# Patient Record
Sex: MALE | Race: WHITE | NOT HISPANIC OR LATINO | Employment: OTHER | URBAN - METROPOLITAN AREA
[De-identification: names, ages, dates, MRNs, and addresses within clinical notes are randomized per-mention and may not be internally consistent; named-entity substitution may affect disease eponyms.]

---

## 2022-10-31 ENCOUNTER — OFFICE VISIT (OUTPATIENT)
Dept: FAMILY MEDICINE CLINIC | Facility: CLINIC | Age: 57
End: 2022-10-31

## 2022-10-31 VITALS
HEART RATE: 80 BPM | WEIGHT: 206.8 LBS | BODY MASS INDEX: 30.63 KG/M2 | DIASTOLIC BLOOD PRESSURE: 82 MMHG | HEIGHT: 69 IN | OXYGEN SATURATION: 97 % | SYSTOLIC BLOOD PRESSURE: 132 MMHG | TEMPERATURE: 97.1 F

## 2022-10-31 DIAGNOSIS — F17.210 TOBACCO DEPENDENCE DUE TO CIGARETTES: ICD-10-CM

## 2022-10-31 DIAGNOSIS — Z13.1 SCREENING FOR DIABETES MELLITUS: ICD-10-CM

## 2022-10-31 DIAGNOSIS — Z13.29 SCREENING FOR THYROID DISORDER: ICD-10-CM

## 2022-10-31 DIAGNOSIS — G89.29 CHRONIC PAIN OF RIGHT KNEE: ICD-10-CM

## 2022-10-31 DIAGNOSIS — G89.29 CHRONIC BILATERAL LOW BACK PAIN WITHOUT SCIATICA: Primary | ICD-10-CM

## 2022-10-31 DIAGNOSIS — Z11.59 ENCOUNTER FOR HEPATITIS C SCREENING TEST FOR LOW RISK PATIENT: ICD-10-CM

## 2022-10-31 DIAGNOSIS — M25.561 CHRONIC PAIN OF RIGHT KNEE: ICD-10-CM

## 2022-10-31 DIAGNOSIS — M54.50 CHRONIC BILATERAL LOW BACK PAIN WITHOUT SCIATICA: Primary | ICD-10-CM

## 2022-10-31 DIAGNOSIS — Z13.220 SCREENING CHOLESTEROL LEVEL: ICD-10-CM

## 2022-10-31 DIAGNOSIS — Z13.0 SCREENING, ANEMIA, DEFICIENCY, IRON: ICD-10-CM

## 2022-10-31 DIAGNOSIS — Z12.11 SCREENING FOR MALIGNANT NEOPLASM OF COLON: ICD-10-CM

## 2022-10-31 RX ORDER — METHOCARBAMOL 500 MG/1
500 TABLET, FILM COATED ORAL 3 TIMES DAILY PRN
Qty: 30 TABLET | Refills: 0 | Status: SHIPPED | OUTPATIENT
Start: 2022-10-31

## 2022-10-31 RX ORDER — MELOXICAM 15 MG/1
15 TABLET ORAL DAILY
Qty: 30 TABLET | Refills: 1 | Status: SHIPPED | OUTPATIENT
Start: 2022-10-31

## 2022-10-31 RX ORDER — IBUPROFEN 200 MG
200 TABLET ORAL EVERY 6 HOURS PRN
COMMUNITY

## 2022-10-31 NOTE — PROGRESS NOTES
Name: Claudette Persons      : 1965      MRN: [id number]  Encounter Provider: SYD Faustin  Encounter Date: 10/31/2022   Encounter department: [facility]     1  Chronic bilateral low back pain without sciatica  Assessment & Plan:  Check xray lumbar spine  Robaxin and meloxicam as directed for pain- hold ibuprofen  Okay to take tylenol      Orders:  -     XR spine lumbar minimum 4 views non injury; Future; Expected date: 10/31/2022  -     meloxicam (Mobic) 15 mg tablet; Take 1 tablet (15 mg total) by mouth daily  -     methocarbamol (ROBAXIN) 500 mg tablet; Take 1 tablet (500 mg total) by mouth 3 (three) times a day as needed for muscle spasms    2  Chronic pain of right knee  Assessment & Plan:  Check xray right knee  Referral to ortho  meloxicam prn pain    Orders:  -     XR knee 4+ vw right injury; Future; Expected date: 10/31/2022  -     Ambulatory Referral to Orthopedic Surgery; Future  -     meloxicam (Mobic) 15 mg tablet; Take 1 tablet (15 mg total) by mouth daily    3  Tobacco dependence due to cigarettes  Assessment & Plan:  Not interested in quitting right now  Referred for CT lung cancer screening    Orders:  -     CT lung screening program; Future; Expected date: 10/31/2022    4  Screening for malignant neoplasm of colon  -     Ambulatory referral for colonoscopy; Future    5  Screening for thyroid disorder  -     TSH, 3rd generation with Free T4 reflex; Future  -     TSH, 3rd generation with Free T4 reflex    6  Screening cholesterol level  -     Lipid panel; Future  -     Lipid panel    7  Screening for diabetes mellitus  -     Comprehensive metabolic panel; Future  -     Comprehensive metabolic panel    8  Screening, anemia, deficiency, iron  -     CBC and differential; Future  -     CBC and differential    9   Encounter for hepatitis C screening test for low risk patient  -     Hepatitis C RNA, quantitative, PCR; Future  -     Hepatitis C RNA, quantitative, PCR         Subjective      Here today as a new patient  Has not seen a provider in years  No recent labs to review  Has had right knee pain for the past 2 years  Right knee "bad toothache" inside knee  Dull ache, keeps him awake at night time, minimally swollen currently but usually more swollen, wears a brace but if he wears it for to long it worsens  Hinge brace but doesn't have it locked  Knee feels unstable going down steps  Feels his knee is going to buckle and give out underneath him  No trauma that exacerbated the symptoms  Has progressively gotten worse  Has been taking advil- doesn't take the edge off  Has had xray and MRI of right knee but more than 10 years  S/p meniscectomy in the 90's  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  No surgeries to his spine  [place] [facility] [place] 8[place]  Pain in back is all low back sometimes radiates into buttocks but not into legs  No loss of bowel or urinary incontinence  Dull ache at all times then exacerbates with a wrong moment or lifting something abnormal     Review of Systems   Constitutional: Positive for activity change (secondary to pain)  Negative for fatigue and unexpected weight change  HENT: Negative  Respiratory: Negative  Cardiovascular: Negative  Gastrointestinal: Negative  Genitourinary: Negative  Musculoskeletal: Positive for arthralgias, back pain and joint swelling  Right knee pain, back pain     Skin: Negative for rash  Neurological: Positive for weakness (right leg secondary to knee pain)  Hematological: Negative  Psychiatric/Behavioral: Negative          Current Outpatient Medications on File Prior to Visit   Medication Sig   • ibuprofen (MOTRIN) 200 mg tablet Take 200 mg by mouth every 6 (six) hours as needed       Objective     /82 (BP Location: Left arm, Patient Position: Sitting, Cuff Size: Large) Pulse 80   Temp (!) 97 1 °F (36 2 °C) (Tympanic)   Ht 5' 9" (1 753 m)   Wt 93 8 kg (206 lb 12 8 oz)   SpO2 97%   BMI 30 54 kg/m²     Physical Exam  Vitals and nursing note reviewed  Constitutional:       General: He is in acute distress (acute pain distress)  Appearance: Normal appearance  HENT:      Head: Normocephalic  Right Ear: Tympanic membrane, ear canal and external ear normal       Left Ear: Tympanic membrane, ear canal and external ear normal       Nose: Nose normal       Mouth/Throat:      Mouth: Mucous membranes are moist       Pharynx: Oropharynx is clear  Eyes:      Extraocular Movements: Extraocular movements intact  Pupils: Pupils are equal, round, and reactive to light  Cardiovascular:      Rate and Rhythm: Normal rate and regular rhythm  Pulses:           Radial pulses are 1+ on the right side and 1+ on the left side  Heart sounds: Normal heart sounds  Pulmonary:      Effort: Pulmonary effort is normal       Breath sounds: Normal breath sounds  Abdominal:      General: Bowel sounds are normal       Palpations: Abdomen is soft  Musculoskeletal:      Cervical back: Normal range of motion  Lumbar back: Spasms present  Decreased range of motion  Negative right straight leg raise test and negative left straight leg raise test       Right knee: Swelling present  Decreased range of motion  Tenderness present over the medial joint line, MCL and patellar tendon  No LCL laxity, MCL laxity, ACL laxity or PCL laxity  Instability Tests: Anterior drawer test negative  Posterior drawer test negative  Anterior Lachman test negative  Medial Andrea test positive  Left knee: Normal       Right lower leg: No edema  Left lower leg: No edema  Skin:     General: Skin is warm and dry  Neurological:      Mental Status: He is alert and oriented to person, place, and time     Psychiatric:         Mood and Affect: Mood normal          Behavior: Behavior normal          Thought Content:  Thought content normal          Judgment: Judgment normal        SYD Reeves

## 2022-11-01 PROBLEM — M54.50 CHRONIC BILATERAL LOW BACK PAIN WITHOUT SCIATICA: Status: ACTIVE | Noted: 2022-11-01

## 2022-11-01 PROBLEM — F17.210 TOBACCO DEPENDENCE DUE TO CIGARETTES: Status: ACTIVE | Noted: 2022-11-01

## 2022-11-01 PROBLEM — G89.29 CHRONIC PAIN OF RIGHT KNEE: Status: ACTIVE | Noted: 2022-11-01

## 2022-11-01 PROBLEM — G89.29 CHRONIC BILATERAL LOW BACK PAIN WITHOUT SCIATICA: Status: ACTIVE | Noted: 2022-11-01

## 2022-11-01 PROBLEM — M25.561 CHRONIC PAIN OF RIGHT KNEE: Status: ACTIVE | Noted: 2022-11-01

## 2022-11-01 NOTE — PATIENT INSTRUCTIONS
Check xray of lumbar spine and right knee  Referred to ortho for right knee  Schedule CT lung due to smoking history  Complete colonoscopy  Check fasting labs

## 2022-11-01 NOTE — ASSESSMENT & PLAN NOTE
Check xray lumbar spine  Robaxin and meloxicam as directed for pain- hold ibuprofen  Okay to take tylenol

## 2022-12-30 ENCOUNTER — HOSPITAL ENCOUNTER (OUTPATIENT)
Dept: RADIOLOGY | Facility: HOSPITAL | Age: 57
Discharge: HOME | End: 2022-12-30

## 2022-12-30 DIAGNOSIS — G89.29 CHRONIC PAIN OF RIGHT KNEE: ICD-10-CM

## 2022-12-30 DIAGNOSIS — G89.29 CHRONIC BILATERAL LOW BACK PAIN WITHOUT SCIATICA: ICD-10-CM

## 2022-12-30 DIAGNOSIS — M25.561 CHRONIC PAIN OF RIGHT KNEE: ICD-10-CM

## 2022-12-30 DIAGNOSIS — M54.50 CHRONIC BILATERAL LOW BACK PAIN WITHOUT SCIATICA: ICD-10-CM

## 2023-01-03 ENCOUNTER — TELEPHONE (OUTPATIENT)
Dept: FAMILY MEDICINE CLINIC | Facility: CLINIC | Age: 58
End: 2023-01-03

## 2023-01-03 NOTE — TELEPHONE ENCOUNTER
----- Message from Socorro Rosas, [address] sent at 1/3/2023 12:57 PM EST -----  Please let Paulie Muller know his back xray shows age appropriate arthritis, no acute fractures  It did show plaque build up in arteries  He should get his blood work done (ordered 10/2022) to check on his cholesterol  The xray of his right knee shows severe arthritis  Would recommend orthopedic evaluation- I gave him the referral when he was here last- he should call to schedule appointment

## 2023-01-03 NOTE — RESULT ENCOUNTER NOTE
Please let Carter Ford know his back xray shows age appropriate arthritis, no acute fractures  It did show plaque build up in arteries  He should get his blood work done (ordered 10/2022) to check on his cholesterol  The xray of his right knee shows severe arthritis  Would recommend orthopedic evaluation- I gave him the referral when he was here last- he should call to schedule appointment

## 2023-01-03 NOTE — RESULT ENCOUNTER NOTE
Please let Luisa Mirf know his back xray shows age appropriate arthritis, no acute fractures  It did show plaque build up in arteries  He should get his blood work done (ordered 10/2022) to check on his cholesterol  The xray of his right knee shows severe arthritis  Would recommend orthopedic evaluation- I gave him the referral when he was here last- he should call to schedule appointment

## 2023-02-01 ENCOUNTER — OFFICE VISIT (OUTPATIENT)
Dept: FAMILY MEDICINE CLINIC | Facility: CLINIC | Age: 58
End: 2023-02-01

## 2023-02-01 VITALS
SYSTOLIC BLOOD PRESSURE: 150 MMHG | WEIGHT: 211 LBS | OXYGEN SATURATION: 96 % | HEIGHT: 69 IN | TEMPERATURE: 97.2 F | BODY MASS INDEX: 31.25 KG/M2 | HEART RATE: 94 BPM | DIASTOLIC BLOOD PRESSURE: 84 MMHG

## 2023-02-01 DIAGNOSIS — R35.0 URINARY FREQUENCY: ICD-10-CM

## 2023-02-01 DIAGNOSIS — F17.210 TOBACCO DEPENDENCE DUE TO CIGARETTES: ICD-10-CM

## 2023-02-01 DIAGNOSIS — G89.29 CHRONIC BILATERAL LOW BACK PAIN WITHOUT SCIATICA: ICD-10-CM

## 2023-02-01 DIAGNOSIS — Z00.00 ANNUAL PHYSICAL EXAM: Primary | ICD-10-CM

## 2023-02-01 DIAGNOSIS — M17.11 ARTHRITIS OF RIGHT KNEE: ICD-10-CM

## 2023-02-01 DIAGNOSIS — M25.561 CHRONIC PAIN OF RIGHT KNEE: ICD-10-CM

## 2023-02-01 DIAGNOSIS — R35.1 NOCTURIA: ICD-10-CM

## 2023-02-01 DIAGNOSIS — R81 GLUCOSE FOUND IN URINE ON EXAMINATION: ICD-10-CM

## 2023-02-01 DIAGNOSIS — M54.50 CHRONIC BILATERAL LOW BACK PAIN WITHOUT SCIATICA: ICD-10-CM

## 2023-02-01 DIAGNOSIS — R03.0 ELEVATED BP WITHOUT DIAGNOSIS OF HYPERTENSION: ICD-10-CM

## 2023-02-01 DIAGNOSIS — G89.29 CHRONIC PAIN OF RIGHT KNEE: ICD-10-CM

## 2023-02-01 LAB
SL AMB  POCT GLUCOSE, UA: ABNORMAL
SL AMB LEUKOCYTE ESTERASE,UA: ABNORMAL
SL AMB POCT BILIRUBIN,UA: ABNORMAL
SL AMB POCT BLOOD,UA: ABNORMAL
SL AMB POCT CLARITY,UA: CLEAR
SL AMB POCT COLOR,UA: YELLOW
SL AMB POCT KETONES,UA: ABNORMAL
SL AMB POCT NITRITE,UA: ABNORMAL
SL AMB POCT PH,UA: 5.5
SL AMB POCT SPECIFIC GRAVITY,UA: 1.02
SL AMB POCT URINE PROTEIN: ABNORMAL
SL AMB POCT UROBILINOGEN: 0.2

## 2023-02-01 RX ORDER — MELOXICAM 15 MG/1
15 TABLET ORAL DAILY
Qty: 30 TABLET | Refills: 1 | Status: SHIPPED | OUTPATIENT
Start: 2023-02-01

## 2023-02-01 RX ORDER — NICOTINE 21 MG/24HR
1 PATCH, TRANSDERMAL 24 HOURS TRANSDERMAL EVERY 24 HOURS
Qty: 28 PATCH | Refills: 1 | Status: SHIPPED | OUTPATIENT
Start: 2023-02-01

## 2023-02-01 RX ORDER — TRAMADOL HYDROCHLORIDE 50 MG/1
50 TABLET ORAL EVERY 8 HOURS PRN
Qty: 20 TABLET | Refills: 0 | Status: SHIPPED | OUTPATIENT
Start: 2023-02-01

## 2023-02-01 NOTE — PROGRESS NOTES
[facility] [id number]    NAME: Ana Red  AGE: 62 y o  SEX: male  : 1965     DATE: 2023     Assessment and Plan:     Problem List Items Addressed This Visit        Musculoskeletal and Integument    Arthritis of right knee     Xray shows severe arthritis  Follow up with ortho next week  meloxicam daily, take with food  Tramadol TID as needed severe pain         Relevant Medications    traMADol (Ultram) 50 mg tablet       Other    Chronic bilateral low back pain without sciatica    Relevant Medications    meloxicam (Mobic) 15 mg tablet    Chronic pain of right knee     Follow up with ortho next week  meloxicam daily, take with food  Tramadol TID as needed severe pain             Relevant Medications    meloxicam (Mobic) 15 mg tablet    Tobacco dependence due to cigarettes     Start nicotine patches, change every 24 hours  Discussed side effects of patch  Can taper down as he smokes less and less         Relevant Medications    nicotine (NICODERM CQ) 21 mg/24 hr TD 24 hr patch    Nocturia     Check urine dip  Check PSA         Relevant Orders    PSA Total (Reflex To Free)    POCT urine dip (Completed)    Urinary frequency     Urine dip + glucose, need to check A1c  Check PSA         Relevant Orders    POCT urine dip (Completed)    Elevated BP without diagnosis of hypertension     BP elevated likely secondary to pain  Will closely monitor  Increase water, limit Na+ intake  Healthy weight  Encouraged smoking cessation        Other Visit Diagnoses     Annual physical exam    -  Primary    Glucose found in urine on examination        Relevant Orders    Hemoglobin A1c (w/out EAG)    UA w Reflex to Microscopic w Reflex to Culture - Clinic Collect    Microalbumin / creatinine urine ratio    Urine culture          Immunizations and preventive care screenings were discussed with patient today   Appropriate education was printed on patient's after visit summary  Discussed risks and benefits of prostate cancer screening  We discussed the controversial history of PSA screening for prostate cancer in the [place] as well as the risk of over detection and over treatment of prostate cancer by way of PSA screening  The patient understands that PSA blood testing is an imperfect way to screen for prostate cancer and that elevated PSA levels in the blood may also be caused by infection, inflammation, prostatic trauma or manipulation, urological procedures, or by benign prostatic enlargement  The role of the digital rectal examination in prostate cancer screening was also discussed and I discussed with him that there is large interobserver variability in the findings of digital rectal examination  Counseling:  Alcohol/drug use: discussed moderation in alcohol intake, the recommendations for healthy alcohol use, and avoidance of illicit drug use  Dental Health: discussed importance of regular tooth brushing, flossing, and dental visits  Injury prevention: discussed safety/seat belts, safety helmets, smoke detectors, carbon dioxide detectors, and smoking near bedding or upholstery  Sexual health: discussed sexually transmitted diseases, partner selection, use of condoms, avoidance of unintended pregnancy, and contraceptive alternatives  · Exercise: the importance of regular exercise/physical activity was discussed  Recommend exercise 3-5 times per week for at least 30 minutes  BMI Counseling: Body mass index is 31 16 kg/m²  The BMI is above normal  Nutrition recommendations include decreasing portion sizes, encouraging healthy choices of fruits and vegetables, decreasing fast food intake, consuming healthier snacks, limiting drinks that contain sugar, moderation in carbohydrate intake, increasing intake of lean protein, reducing intake of saturated and trans fat and reducing intake of cholesterol   Rationale for BMI follow-up plan is due to patient being overweight or obese  Depression Screening and Follow-up Plan: Patient was screened for depression during today's encounter  They screened negative with a PHQ-2 score of 0  Tobacco Cessation Counseling: Tobacco cessation counseling was provided  The patient is sincerely urged to quit consumption of tobacco  He is ready to quit tobacco  Medication options and side effects of medication discussed  Patient agreed to medication  Nicotine patch was prescribed  No follow-ups on file  Chief Complaint:     Chief Complaint   Patient presents with   • Follow-up     Fu want to talk to you he found out he has to get a knee replace no other concerns       History of Present Illness:     Adult Annual Physical   Patient here for a comprehensive physical exam  The patient reports problems - severe right knee pain  Urinary frequency, nocturia  Severe right knee pain- unsure if the meloxicam helped or not but he doesn't have any more left  Daughter made him an appointment with orthopedist but unsure when  Xray showed severe arthritis  Rates pain 7-8/10 aching throbbing  He cannot walk up and down stairs easily due to severe pain  He hasnt been able to do activities he normally does  Has gained weight due to inactivity  Few week history of urinary frequency, nocturia 4x nightly, incomplete emptying, denies decreased urine flow  No blood in urine  Has been very sedentary due to severe right knee pain  Hasn't completed the blood work from October  Drinks alcohol- socially but nothing in last 2 weeks  Has referrals for colonoscopy  Due for CT lung cancer screening- has order    Diet and Physical Activity  · Diet/Nutrition: well balanced diet  · Exercise: no formal exercise and in severe pain with right knee        Depression Screening  PHQ-2/9 Depression Screening    Little interest or pleasure in doing things: 0 - not at all  Feeling down, depressed, or hopeless: 0 - not at all  PHQ-2 Score: 0  PHQ-2 Interpretation: Negative depression screen       General Health  · Sleep: sleeps poorly  · Hearing: normal - bilateral   · Vision: no vision problems  · Dental: no dental visits for >1 year   Health  · Symptoms include: dysuria, urinary frequency, incomplete bladder emptying and nocturia     Review of Systems:     Review of Systems   Constitutional: Positive for activity change  Negative for chills, fatigue and fever  HENT: Negative  Respiratory: Negative  Cardiovascular: Negative  Gastrointestinal: Negative  Endocrine: Negative  Genitourinary: Positive for dysuria and frequency (nocturia)  Negative for penile pain, penile swelling, scrotal swelling and testicular pain  Musculoskeletal: Positive for arthralgias (severe right knee pain)  Skin: Negative  Neurological: Negative  Hematological: Negative  Psychiatric/Behavioral: Positive for dysphoric mood (secondary to pain)  Past Medical History:     History reviewed  No pertinent past medical history  Past Surgical History:     Past Surgical History:   Procedure Laterality Date   • [address] [facility] [place])   • SKIN GRAFT Left 2000    Left Arm Laceration repaired- and Skin graft completed  Family History:     Family History   Family history unknown: Yes      Social History:     Social History     Socioeconomic History   • Marital status:      Spouse name: None   • Number of children: None   • Years of education: None   • Highest education level: None   Occupational History   • None   Tobacco Use   • Smoking status: Every Day     Packs/day: 1 00     Years: 35 00     Pack years: 35 00     Types: Cigarettes     Start date: 1/1/1985   • Smokeless tobacco: Never   Vaping Use   • Vaping Use: Never used   Substance and Sexual Activity   • Alcohol use:  Yes     Alcohol/week: 2 0 standard drinks     Types: 2 Cans of beer per week     Comment: Approximately 2 drinsk per week • Drug use: Yes     Frequency: 1 0 times per week     Types: Marijuana     Comment: Less than 1 use per every two weeks   • Sexual activity: Not Currently     Comment:    Other Topics Concern   • None   Social History Narrative   • None     Social Determinants of Health     Financial Resource Strain: Not on file   Food Insecurity: Not on file   Transportation Needs: Not on file   Physical Activity: Not on file   Stress: Not on file   Social Connections: Not on file   Intimate Partner Violence: Not on file   Housing Stability: Not on file      Current Medications:     Current Outpatient Medications   Medication Sig Dispense Refill   • ibuprofen (MOTRIN) 200 mg tablet Take 200 mg by mouth every 6 (six) hours as needed     • meloxicam (Mobic) 15 mg tablet Take 1 tablet (15 mg total) by mouth daily 30 tablet 1   • methocarbamol (ROBAXIN) 500 mg tablet Take 1 tablet (500 mg total) by mouth 3 (three) times a day as needed for muscle spasms 30 tablet 0   • nicotine (NICODERM CQ) 21 mg/24 hr TD 24 hr patch Place 1 patch on the skin over 24 hours every 24 hours 28 patch 1   • traMADol (Ultram) 50 mg tablet Take 1 tablet (50 mg total) by mouth every 8 (eight) hours as needed for moderate pain or severe pain 20 tablet 0     No current facility-administered medications for this visit  Allergies:     No Known Allergies   Physical Exam:     /84   Pulse 94   Temp (!) 97 2 °F (36 2 °C) (Tympanic)   Ht 5' 9" (1 753 m)   Wt 95 7 kg (211 lb)   SpO2 96%   BMI 31 16 kg/m²     Physical Exam  Vitals and nursing note reviewed  Constitutional:       Appearance: Normal appearance  HENT:      Head: Normocephalic  Eyes:      Conjunctiva/sclera: Conjunctivae normal       Pupils: Pupils are equal, round, and reactive to light  Cardiovascular:      Rate and Rhythm: Normal rate and regular rhythm  Heart sounds: Normal heart sounds  No murmur heard    Pulmonary:      Effort: Pulmonary effort is normal  No respiratory distress  Breath sounds: Normal breath sounds  Abdominal:      General: Bowel sounds are normal       Palpations: Abdomen is soft  Musculoskeletal:      Right lower leg: No edema  Left lower leg: No edema  Lymphadenopathy:      Cervical: No cervical adenopathy  Skin:     Findings: No erythema or rash  Neurological:      Mental Status: He is alert and oriented to person, place, and time  Psychiatric:         Mood and Affect: Mood is depressed  Affect is flat  Behavior: Behavior normal          Thought Content:  Thought content normal           Omar Allan, [address] [facility]

## 2023-02-01 NOTE — ASSESSMENT & PLAN NOTE
BP elevated likely secondary to pain  Will closely monitor  Increase water, limit Na+ intake  Healthy weight  Encouraged smoking cessation

## 2023-02-01 NOTE — ASSESSMENT & PLAN NOTE
Xray shows severe arthritis  Follow up with ortho next week  meloxicam daily, take with food  Tramadol TID as needed severe pain

## 2023-02-01 NOTE — ASSESSMENT & PLAN NOTE
Start nicotine patches, change every 24 hours  Discussed side effects of patch  Can taper down as he smokes less and less

## 2023-02-03 ENCOUNTER — TELEPHONE (OUTPATIENT)
Dept: FAMILY MEDICINE CLINIC | Facility: CLINIC | Age: 58
End: 2023-02-03

## 2023-02-03 LAB
ALBUMIN/CREAT UR: 23 MG/G CREAT (ref 0–29)
BACTERIA UR CULT: NORMAL
CREAT UR-MCNC: 71.9 MG/DL
Lab: NORMAL
MICROALBUMIN UR-MCNC: 16.8 UG/ML

## 2023-02-03 NOTE — TELEPHONE ENCOUNTER
Call pt and talk to PT his is going to get his blood work done ASAP and have a FU with [place] on the 13th at [phone]

## 2023-02-03 NOTE — TELEPHONE ENCOUNTER
----- Message from Marielena Sherwood, [address] sent at 2/3/2023  8:23 AM EST -----  Please let patient know his urine culture came back negative  His increased urination is likely related to high levels of glucose that were seen in the urine  He needs to complete blood work as soon as possible to look at his blood levels of glucose and strong possibility of diabetes  All the labs are placed in the computer for him to go to [facility] or come here

## 2023-02-03 NOTE — RESULT ENCOUNTER NOTE
Please let patient know his urine culture came back negative  His increased urination is likely related to high levels of glucose that were seen in the urine  He needs to complete blood work as soon as possible to look at his blood levels of glucose and strong possibility of diabetes  All the labs are placed in the computer for him to go to [facility] or come here

## 2023-03-31 ENCOUNTER — TELEPHONE (OUTPATIENT)
Dept: FAMILY MEDICINE CLINIC | Facility: CLINIC | Age: 58
End: 2023-03-31

## 2023-03-31 NOTE — TELEPHONE ENCOUNTER
"Pt's brother in law called to request that Ollie Zhou see this pt going forward  States that the Pt feels that L Pamella Leyden \"does not have his best interests at heart and would like to see Ollie Zhou because he knows how happy I am with her  \"    I advised his to inform the pt to request Ollie Zhou for his next office visit and that if he decides he likes Ollie Zhou at that time we can change his PCP in his chart for the future    "